# Patient Record
(demographics unavailable — no encounter records)

---

## 2024-10-09 NOTE — DISCUSSION/SUMMARY
[FreeTextEntry1] : Patient is cardiac stable.  There are no active cardiac issues.  Patient should remain on her present regimen of medication  Routine follow-up again in 4 months

## 2024-10-09 NOTE — REASON FOR VISIT
[FreeTextEntry1] : Naila Childress 65 years old here for routine follow-up of her cardiac status.  She was seen on October 9 2024.

## 2024-10-09 NOTE — ASSESSMENT
[FreeTextEntry1] : 1.  History of chronic ischemic heart disease status post PTCI of OM1 with patent stent on catheterization in 2020.   patient has had some recent recurrent symptoms of exertional jaw pain which has now resolved.  Presently she is asymptomatic from a cardiac point of view without chest pain chest pressure shortness of breath.  Patient remains asymptomatic continues to have a normal EKG  2.  Hypertension blood pressure today is on the lower side without symptoms.  She had a syncopal episode in May secondary to low blood pressure and hypokalemia her medications have been modified and presently she is asymptomatic with blood pressure of 115/70  3.  Hyperlipidemia on Repatha and Lipitor recently started because LDL repeat LDL was over 100 Lipitor was increased.  The number does not make a lot of sense light of the fact that she is also on Repatha and previous blood work had been at guideline..  Last LDL was in the mid 50s 4. Elevated BMI-patient has lost approximately 25 pounds on Mounjaro which may explain some of the decrease in blood pressure.  S lost over 50 pounds since being started on Mounjaro and is no longer on her other diabetic medication  5.  Connective tissue disorder with elevated sed rate AZALEA cardiolipin- recent sed rate  44 on hydrochloric when   presently there are no active cardiac issues,  pressure is toowell controlled and the patient has good exercise tolerance.  Lipid panel is not ideal despite multiple medications and these numbers should be repeated within 3 months

## 2024-10-09 NOTE — HISTORY OF PRESENT ILLNESS
[FreeTextEntry1] : Naila is 63 years old with a history of diabetes hypertension and hyperlipidemia presently on a statin and Repatha, status post stent to OM1 many years.  She was evaluated preoperatively for ventral hernia surgery and had some symptoms of shortness of breath and atypical chest pain.  Stress test was mildly abnormal and repeat angiogram revealed that the stent in the circumflex was widely patent and there was no other significant disease.  She underwent ventral hernia surgery in July with subsequent complications of an abscess which finally resolved  Overall she presently is feeling well without exertional chest pain chest pressure shortness of breath.  She denies dizziness or syncope.  EKG  previous visitst unremarkable and stable WNL  September 1, 2021 blood work revealed a hemoglobin of 13.2 hematocrit 40.8 WBC 8.10 with lymphocytes 44.9% T4 11.5 vitamin D 23.6 TSH 0.37 down from 6.64 LDL 73 triglycerides 136 cholesterol 145 Creatinine 1.52 calcium 10.1 potassium 4.2   The patient on last visit, from a cardiac point of view has been feeling well without chest pain chest pressure shortness of breath.  Her blood pressure has been slightly elevated and Microzide was added to her regimen  Several months ago, sh was coming downstairs she felt somewhat dizzy and unsteady on her feet and fell and hit the back of her head.  She went to [facility] IJ CT of his head was normal with repeat and all her blood work was in order an EKG was normal with no evidence of arrhythmia.  Blood tests were also normal.  She does have some element of ataxia and this is being evaluated by neurology  Patient has been doing well uses a CPAP machine for her obstructive sleep apnea is relatively careful with the salt in her diet and is compliant with her medications. She however remains overweight and perhaps is gained some weight. Her blood pressure has been on the higher side and hydrochlorothiazide was added at 12.5  Again she denies any significant chest pain chest pressure or shortness of breath. She is active.  She has had an extensive ophthalmologic and subsequent rheumatological evaluation for her dizziness abnormal eye exam, arthritis.  She has been diagnosed with a connective tissue disorder with an elevated sed rate 43 some abnormal cardiolipins positive AZALEA.  She is to be started on hydrochloroquine after final ophthalmologic evaluation  From a cardiac point of view she remained stable and remains on stable medication EKG 1/24/2022 normal sinus rhythm decreased R wave progression no significant change   recently, the patient has been complaining of exertional jaw pain very mild particularly when she is carrying heavy packages..  It lasts briefly.  We had evaluated her for similar symptoms several years ago with a repeat angiogram which was unremarkable with widely patent stents and no other significant disease.   EKG September 28, 2022 normal sinus rhythm and within normal limit    Recent blood tests September 2022  TSH 0.67 normal sed rate normal CRP potassium 5.2 GFR 98  lipid panel LDL 59 cholesterol 129 triglycerides 111   visit January 4 23: Patient is feeling quite well.  She still has stiffness in her hands particularly in the morning.  She is now on hydroxychloroquine and does see the ophthalmologist to check her retina while she is on the  hydroxychloroquine and everything appears to be fine .   From a cardiac point of view she has no symptoms no chest pain no shortness of breath.  She is tolerating her present regimen of medications including the Repatha.    EKG January 4, 2023 normal sinus rhythm within normal limit   visit May 23, 2023: Patient is being treated by rheumatology for an inflammatory arthritis on hydroxychloroquine.  Recent blood test were remarkable for an elevated sed rate but other markers were unremarkable   blood test in January 2023 HDL 53 LDL 33  patient is no longer taking Lipitor but is only on Repatha   she denies chest pain chest pressure shortness of breath palpitations dizziness or syncope  EKG MayMay 23, 2023 normal sinus rhythm within normal limits  Visit September 1, 2023:: Patient remains asymptomatic without chest pain chest pressure palpitations or shortness of breath.  She is now on Mounjaro and has lost a little bit of weight.  EKG September 1, 2023 is within normal limits  no michael  The patient was supposed to see me in October but left the blood work in July23 triglyceride 103 cholesterol 167 HDL 51 and LDL 95.  I placed her on Lipitor in addition to the Repatha.  She will get repeat blood test today  Visit April 9, 2024: The patient feels well without chest pain chest pressure shortness of breath.  She has been told that her blood pressure has been on the lower side running 110/60.  She denies dizziness.  Recently her LDL was above 100 despite being on Repatha.  This is quite different than her prior bloods which she had 1 point went down to the mid 50s LDL and last time was in the mid 70s.  Her Lipitor was increased to 40.  She remains on otherwise stable medication  EKG April 9, 2024 normal sinus rhythm within normal limits She has good exercise tolerance denies chest pain chest pressure lightheadedness or dizziness  2D echo in November 2023 was unremarkable with a hyperdynamic left ventricle no significant valvular disease She has lost approximately 20 to 30 pounds since last visit and is now on Mounjaro  Visit October 9, 2024 The patient in May had a syncopal episode.  She was hospitalized.  Her blood pressure was low and her potassium was low and her medications were modified.  She is lost about 50 pounds on Mounjaro and is now off of her diabetic meds except for the Mounjaro.  She has no chest pain chest pressure shortness of breath.  She occasionally feels somewhat lightheaded but has had no further syncope.

## 2024-10-09 NOTE — PHYSICAL EXAM
[Normal Appearance] : normal appearance [General Appearance - Well Nourished] : well nourished [General Appearance - In No Acute Distress] : no acute distress [Normal Conjunctiva] : the conjunctiva exhibited no abnormalities [No Jugular Venous Kim A Waves] : no jugular venous kim A waves [Heart Sounds] : normal S1 and S2 [Murmurs] : no murmurs present [Arterial Pulses Normal] : the arterial pulses were normal [Respiration, Rhythm And Depth] : normal respiratory rhythm and effort [Auscultation Breath Sounds / Voice Sounds] : lungs were clear to auscultation bilaterally [Abdomen Soft] : soft [Abdomen Tenderness] : non-tender [Abnormal Walk] : normal gait [Cyanosis, Localized] : no localized cyanosis [FreeTextEntry1] : No edema [Oriented To Time, Place, And Person] : oriented to person, place, and time [Affect] : the affect was normal [Mood] : the mood was normal

## 2024-11-05 NOTE — REASON FOR VISIT
I spoke with pt mom. Explained that the nurse tried calling her twice yesterday receiving no answer. The nurse also send a [employer] message. I apologized and offered them an afternoon appt. She denied it and asked that I send her a message.      Jane            ----- Message from Razia sent at 10/3/2024  9:26 AM CDT -----  Contact: [phone]  Elmer Castrokj mom Jolynn calling regarding stated she never rec'd a pre-check in for appt.  Advised her appt was canceled on yesterday.  Pt mom is upset because she had to rearrange her schedule to bring pt to appt.  She was never notified about the canceled appt.  She also mentioned this is Dr. Otto 2nd time canceling on pt.  she would like to be seen today.  Pt is on her way to the office.  
[Follow-Up: _____] : a [unfilled] follow-up visit

## 2024-11-05 NOTE — PHYSICAL EXAM
[General Appearance - Alert] : alert [General Appearance - In No Acute Distress] : in no acute distress [Respiration, Rhythm And Depth] : normal respiratory rhythm and effort [Musculoskeletal - Swelling] : no joint swelling seen [FreeTextEntry1] : hypopigmented spots arms and chest [Impaired Insight] : insight and judgment were intact

## 2024-11-05 NOTE — ASSESSMENT
[FreeTextEntry1] : #Chronic bilateral hand pain, associated with prolonged stiffness pain and stiffness improved since starting HCQ June 2022   #Positive AZALEA RNP 3.6, rest of serology negative Positive TPO antibodies   -continue with  mg daily current weight 149 lb, can continue same dose of HCQ for now -Obtain monitoring blood work today -last eye exam by Dr Alcala October 2024 reviewed today- cleared to continue  -check disease activity markers today -PFTs completed July 2022, following with pulm for SHAHID advised to reschedule PFTs for monitoring  -TTE/cardiac testing as per cardiology -to follow up with derm regarding hypopigmented skin lesions    RTO in 4 months

## 2024-11-05 NOTE — HISTORY OF PRESENT ILLNESS
[FreeTextEntry1] : Last visit July 2024 At today's visit reports feeling well overall occasional pain in the hands, no swelling, no stiffness no oral or nasal ulcers chronic hypopigmented skin lesions, possibly new spot chest, to see derm  otherwise feeling well on HCQ

## 2025-02-04 NOTE — ASSESSMENT
[FreeTextEntry1] : 1.  History of chronic ischemic heart disease status post PTCI of OM1 with patent stent on catheterization in 2020.   patient has had some recent recurrent symptoms of exertional jaw pain which has now resolved.  Presently she is asymptomatic from a cardiac point of view without chest pain chest pressure shortness of breath.  Patient remains asymptomatic continues to have a normal EKG.  She has no cardiac symptoms  2.  Hypertension blood pressure today is on the lower side without symptoms.  She had a syncopal episode in May secondary to low blood pressure and hypokalemia her medications have been modified and presently she is asymptomatic with blood pressure of 115/70 and remains asymptomatic in February 2025.  Blood pressure remains on the lower side and she did have a near syncopal episode several months ago.  I am not certain she continues to need to take Cozaar 25  3.  Hyperlipidemia on Repatha and Lipitor recently started because LDL repeat LDL was over 100 Lipitor was increased.  The number does not make a lot of sense light of the fact that she is also on Repatha and previous blood work had been at guideline..  Last LDL was in the mid 50s 4. Elevated BMI-patient has lost approximately 25 pounds on Mounjaro which may explain some of the decrease in blood pressure.  S lost over 50 pounds since being started on Mounjaro and is no longer on her other diabetic medication  5.  Connective tissue disorder with elevated sed rate AZALEA cardiolipin- recent sed rate  44 on Hydrochlloroquin   presently there are no active cardiac issues,  pressure is to0 well controlled There are no active cardiac issues in February 2025

## 2025-02-04 NOTE — REASON FOR VISIT
[FreeTextEntry1] : Naila Childress 65 years old here for routine follow-up of her cardiac status.  She was seen on February 4, 2025

## 2025-02-04 NOTE — DISCUSSION/SUMMARY
[FreeTextEntry1] : 1.  I suggested that she stop losartan 25 in light of her lower blood pressure and previous near syncopal episode 2.  She should continue her other medication 3.  Routine follow-up again in 6 months unless is a change in his status  I reassured her that her cardiac status was stable [EKG obtained to assist in diagnosis and management of assessed problem(s)] : EKG obtained to assist in diagnosis and management of assessed problem(s)

## 2025-02-04 NOTE — HISTORY OF PRESENT ILLNESS
[FreeTextEntry1] : Naila is 65 years old with a history of diabetes hypertension and hyperlipidemia presently on a statin and Repatha, status post stent to OM1 many years.  She was evaluated preoperatively for ventral hernia surgery and had some symptoms of shortness of breath and atypical chest pain.  Stress test was mildly abnormal and repeat angiogram revealed that the stent in the circumflex was widely patent and there was no other significant disease.  She underwent ventral hernia surgery in July with subsequent complications of an abscess which finally resolved  Overall she presently is feeling well without exertional chest pain chest pressure shortness of breath.  She denies dizziness or syncope.  EKG  previous visitst unremarkable and stable WNL  September 1, 2021 blood work revealed a hemoglobin of 13.2 hematocrit 40.8 WBC 8.10 with lymphocytes 44.9% T4 11.5 vitamin D 23.6 TSH 0.37 down from 6.64 LDL 73 triglycerides 136 cholesterol 145 Creatinine 1.52 calcium 10.1 potassium 4.2   The patient on last visit, from a cardiac point of view has been feeling well without chest pain chest pressure shortness of breath.  Her blood pressure has been slightly elevated and Microzide was added to her regimen  Several months ago, sh was coming downstairs she felt somewhat dizzy and unsteady on her feet and fell and hit the back of her head.  She went to [facility] IJ CT of his head was normal with repeat and all her blood work was in order an EKG was normal with no evidence of arrhythmia.  Blood tests were also normal.  She does have some element of ataxia and this is being evaluated by neurology  Patient has been doing well uses a CPAP machine for her obstructive sleep apnea is relatively careful with the salt in her diet and is compliant with her medications. She however remains overweight and perhaps is gained some weight. Her blood pressure has been on the higher side and hydrochlorothiazide was added at 12.5  Again she denies any significant chest pain chest pressure or shortness of breath. She is active.  She has had an extensive ophthalmologic and subsequent rheumatological evaluation for her dizziness abnormal eye exam, arthritis.  She has been diagnosed with a connective tissue disorder with an elevated sed rate 43 some abnormal cardiolipins positive AZALEA.  She is to be started on hydrochloroquine after final ophthalmologic evaluation  From a cardiac point of view she remained stable and remains on stable medication EKG 1/24/2022 normal sinus rhythm decreased R wave progression no significant change   recently, the patient has been complaining of exertional jaw pain very mild particularly when she is carrying heavy packages..  It lasts briefly.  We had evaluated her for similar symptoms several years ago with a repeat angiogram which was unremarkable with widely patent stents and no other significant disease.   EKG September 28, 2022 normal sinus rhythm and within normal limit    Recent blood tests September 2022  TSH 0.67 normal sed rate normal CRP potassium 5.2 GFR 98  lipid panel LDL 59 cholesterol 129 triglycerides 111   visit January 4 23: Patient is feeling quite well.  She still has stiffness in her hands particularly in the morning.  She is now on hydroxychloroquine and does see the ophthalmologist to check her retina while she is on the  hydroxychloroquine and everything appears to be fine .   From a cardiac point of view she has no symptoms no chest pain no shortness of breath.  She is tolerating her present regimen of medications including the Repatha.    EKG January 4, 2023 normal sinus rhythm within normal limit   visit May 23, 2023: Patient is being treated by rheumatology for an inflammatory arthritis on hydroxychloroquine.  Recent blood test were remarkable for an elevated sed rate but other markers were unremarkable   blood test in January 2023 HDL 53 LDL 33  patient is no longer taking Lipitor but is only on Repatha   she denies chest pain chest pressure shortness of breath palpitations dizziness or syncope  EKG MayMay 23, 2023 normal sinus rhythm within normal limits  Visit September 1, 2023:: Patient remains asymptomatic without chest pain chest pressure palpitations or shortness of breath.  She is now on Mounjaro and has lost a little bit of weight.  EKG September 1, 2023 is within normal limits  no michael  The patient was supposed to see me in October but left the blood work in July23 triglyceride 103 cholesterol 167 HDL 51 and LDL 95.  I placed her on Lipitor in addition to the Repatha.  She will get repeat blood test today  Visit April 9, 2024: The patient feels well without chest pain chest pressure shortness of breath.  She has been told that her blood pressure has been on the lower side running 110/60.  She denies dizziness.  Recently her LDL was above 100 despite being on Repatha.  This is quite different than her prior bloods which she had 1 point went down to the mid 50s LDL and last time was in the mid 70s.  Her Lipitor was increased to 40.  She remains on otherwise stable medication  EKG April 9, 2024 normal sinus rhythm within normal limits She has good exercise tolerance denies chest pain chest pressure lightheadedness or dizziness  2D echo in November 2023 was unremarkable with a hyperdynamic left ventricle no significant valvular disease She has lost approximately 20 to 30 pounds since last visit and is now on Mounjaro  Visit October 9, 2024 The patient in May had a syncopal episode.  She was hospitalized.  Her blood pressure was low and her potassium was low and her medications were modified.  She is lost about 50 pounds on Mounjaro and is now off of her diabetic meds except for the Mounjaro.  She has no chest pain chest pressure shortness of breath.  She occasionally feels somewhat lightheaded but has had no further syncope.  Visit February 4, 2025 Patient overall feels well.  She recently has had some increased fatigue but no dizziness and no syncope.  She has no chest pain chest pressure or shortness of breath..  She is still on Mounjaro and no longer has elements of diabetes.  EKG February 4, 2025 normal sinus rhythm within normal limits Medications presently include aspirin 81 atorvastatin 40 Effexor 75Hydroxychloroquine 400 twice a dayLosartan 25Metoprolol ER 25Mounjaro and Repatha.  She is also on Tirosint 137 mcg  Lipid panel July 2024 HDL 46 LDL 51

## 2025-03-13 NOTE — PHYSICAL EXAM
[General Appearance - Alert] : alert [General Appearance - In No Acute Distress] : in no acute distress [Impaired Insight] : insight and judgment were intact [Musculoskeletal - Swelling] : no joint swelling seen [FreeTextEntry1] : taut skin over the fingers distally, small oral aperture

## 2025-03-13 NOTE — ASSESSMENT
[FreeTextEntry1] : #Chronic bilateral hand pain, associated with prolonged stiffness pain and stiffness improved since starting HCQ June 2022 recurrence now with morning stiffness for 45 min, episode of swelling (resolved)  # UCTD Positive AZALEA; RNP 3.6, rest of serology negative small oral aperture, faint telangiectasias (face, hands), taut skin distally; no Raynaud's sister with scleroderma  scleroderma labs negative    -will obtain ESR/CRP and RF/CCP today -will obtain X-rays of the hands and US today  -continue with  mg daily current weight 149 lb, can continue same dose of HCQ for now -discussed with patient possibly moving to DMARDs if recurrence of hand swelling and/or if above work up is abnormal  -Obtain monitoring blood work today -last eye exam by Dr Alcala October 2024 reviewed today- cleared to continue -check disease activity markers today -PFTs completed July 2022, following with pulm for SHAHID advised to reschedule PFTs for monitoring Gets annual CT chest for lung cancer screening  -TTE/cardiac testing as per cardiology, last seen Feb 2025 note reviewed today, stable  -following up with derm regarding hypopigmented skin lesions   RTO in 4 months.

## 2025-03-13 NOTE — DATA REVIEWED
[FreeTextEntry1] : Labs and chart notes reviewed today with patient disease activity markers negative in November 2024

## 2025-03-13 NOTE — HISTORY OF PRESENT ILLNESS
[FreeTextEntry1] :     Last visit November 2024 At today's visit -feeling well overall, -was recently in [place] and had an episode of swelling of the hands/fingers with stiffness resolved but continues to have morning stiffness for 45 minutes

## 2025-04-02 NOTE — ASSESSMENT
[FreeTextEntry1] : This is a 65-year-old female whose history has been reviewed above  She has a history of a autoimmune disease she has recently seen rheumatology serologic workup was performed I reviewed completely negative including sed rate and double-stranded DNA  She has a long history of hypertension but has had significant weight loss she has recently been taken off her losartan which was only 25 mg.  She has been getting elevated blood pressures at home but states that when she is not having a headache that normal blood pressure here is entirely normal in fact it is on the low side of normal but she does not have any orthostasis.  She is not on any antihypertensives she is asymptomatic no intervention  She has a history of a mood disorder she remains on Wellbutrin with excellent results  She remains on Mounjaro she has lost 60 pounds she will continue with this medication.  We will treat her empirically for sinus pain or maxillary facial pain syndrome.  However we will start with Flonase and Zyrtec..  I told her if the headaches persist to give me a call.  And to keep taking her blood pressure  I will defer her hemoglobin A1c and cholesterol profile to endocrinology

## 2025-04-02 NOTE — HISTORY OF PRESENT ILLNESS
[FreeTextEntry1] : This is a 65-year-old female for evaluation and treatment of her diabetes ASHD [de-identified] : Patient is in her usual state of health with the exception of having a headache on and off for 4 days.  She relates that when she gets the headache her blood pressure has been significantly elevated at 170 systolic.  She does not have the headache at this time

## 2025-05-02 NOTE — HISTORY OF PRESENT ILLNESS
[TextBox_4] : Interventional Pulmonology Consultation Note First Visit with IP: May  5 2025  2:30PM    Ms. RAMIREZ is a 65-year-old with PMH of connective tissue, hypertension, diabetes, obesity, and sleep apnea. Patient was referred to interventional pulmonology by Dr. Castaneda for abnormal CT of the Chest and possible bronchoscopy with tissue biopsy for a 1 cm circumscribed nodule in the right upper lobe.  Per records, the patient has been following up with Dr. Castaneda since 2017, who has been following her nodule. At that time, she had an 8 mm well-circumscribed right upper lobe nodule. She has been compliant with her surveillance imaging. As of 2022, the nodule measurements have changed from 8 mm to 11 mm and were deemed stable.    Patient most recent CT scan of the Chest done on 04/09/2025 at [facility] was notable for the following: - Since 9/23/2024 unchanged right upper lobe circumscribed 1.1 cm right upper lobe solid nodule. - Unchanged right upper lobe 0.6 cm groundglass nodule.  Ms. RAMIREZ  past medical history is notable for: Anticoagulation: [ ] Emphysema: [ ] Personal History of Lung Cancer: [ ] Family History of Lung Cancer: [ ] Previous imaging or biopsy:  [ ] History of Fungal or Mycobacterial Infections:  [ ] History of Autoimmune Conditions: [ ] Smoking history of []   Today She is [ ]

## 2025-05-02 NOTE — DISCUSSION/SUMMARY
[FreeTextEntry1] : The patients most recent CT scan was reviewed by my independently and my interpretation is stated below: -The nodule has increased in size compared to 2017. Per my measurement, it is 1.3 cm.

## 2025-05-02 NOTE — HISTORY OF PRESENT ILLNESS
[TextBox_4] : Interventional Pulmonology Consultation Note First Visit with IP: May  5 2025  2:30PM    Ms. RAMIREZ is a 65-year-old with PMH of connective tissue, hypertension, diabetes, obesity, and sleep apnea. Patient was referred to interventional pulmonology by Dr. Castaneda for abnormal CT of the Chest and possible bronchoscopy with tissue biopsy for a 1 cm circumscribed nodule in the right upper lobe.  Per records, the patient has been following up with Dr. Castaneda since 2017, who has been following her nodule. At that time, she had an 8 mm well-circumscribed right upper lobe nodule. She has been compliant with her surveillance imaging. As of 2022, the nodule measurements have changed from 8 mm to 11 mm and were deemed stable.    Patient most recent CT scan of the Chest done on 04/09/2025 at [facility] was notable for the following: - Since 9/23/2024 unchanged right upper lobe circumscribed 1.1 cm right upper lobe solid nodule. - Unchanged right upper lobe 0.6 cm groundglass nodule.  Ms. RAMIREZ  past medical history is notable for: Anticoagulation: [ ] Emphysema: [ ] Personal History of Lung Cancer: [ ] Family History of Lung Cancer: [ ] Previous imaging or biopsy:  [ ] History of Fungal or Mycobacterial Infections:  [ ] History of Autoimmune Conditions: [ ] Smoking history of []   Today She is [ ]

## 2025-05-02 NOTE — ASSESSMENT
[FreeTextEntry1] :  Ms. RAMIREZ is a 65-year-old with PMH of connective tissue, hypertension, diabetes, obesity, and sleep apnea. Patient was referred to interventional pulmonology due to having an unchanged right upper lobe circumscribed 1.1 cm right upper lobe solid nodule and unchanged right upper lobe 0.6 cm groundglass nodule.  The differential diagnosis of the nodule based on location, history and appearance including malignancy, infectious etiology, inflammatory etiology and other etiologies was discussed   The diagnostic yield of robotic assisted navigation assisted bronchoscopy with biopsy as well as EBUS with biopsy was discussed with the patient. The risk and benefits of bronchoscopy with navigation assisted transbronchial biopsy including risk of bleeding and risk pneumothorax was discussed with the patient and they demonstrated understanding. In case of pneumothorax, we discussed the need for in hospital monitoring and chest tube placement. In case of bleeding, we explained that they may require inpatient or ICU admission. In case the lesion is suspicious for malignancy on preliminary or there is mediastinal or hilar adenopathy, the patient will need staging of the mediastinum with EBUS guided TBNA in the same procedure. The risk and benefits of EBUS including the risk of bleeding and risk of pneumothorax associated with EBUS was discussed with the patient and he demonstrated understanding. We will also send the tissue/BAL for culture to assess for infectious etiology during the procedure. The patient is agreeable to proceed with a navigation assisted bronchoscopy with biopsy of the lung lesion and EBUS with TBNA. The patient will undergo PST to assess candidacy of general anesthesia as well as discuss the risks and benefits with the anesthesiologist. Educational reading material regarding the procedure, risks and benefits provided to the patient in paper format.   Will need presurgical testing prior to scheduling the procedure. The office will co-ordinate testing and pre-surgical appointment. Will need medical clearance. If cardiac co-morbidities noted, may need additional cardiac clearance. Planned for flexible bronchoscopy, robotic assisted navigation bronchoscopy with biopsy of the lung lesion/rEBUS and evaluation of the hilum and mediastinum using linear EBUS on _/_/_.

## 2025-05-23 NOTE — PHYSICAL EXAM
[Normal Appearance] : normal appearance [General Appearance - Well Nourished] : well nourished [General Appearance - In No Acute Distress] : no acute distress [Normal Conjunctiva] : the conjunctiva exhibited no abnormalities [No Jugular Venous Kim A Waves] : no jugular venous kim A waves [Heart Sounds] : normal S1 and S2 [Murmurs] : no murmurs present [Arterial Pulses Normal] : the arterial pulses were normal [Respiration, Rhythm And Depth] : normal respiratory rhythm and effort [Auscultation Breath Sounds / Voice Sounds] : lungs were clear to auscultation bilaterally [Abdomen Soft] : soft [Abdomen Tenderness] : non-tender [Abnormal Walk] : normal gait [Cyanosis, Localized] : no localized cyanosis [Oriented To Time, Place, And Person] : oriented to person, place, and time [Affect] : the affect was normal [Mood] : the mood was normal [FreeTextEntry1] : No edema

## 2025-05-23 NOTE — ASSESSMENT
[FreeTextEntry1] : 1.  History of chronic ischemic heart disease status post PTCI of OM1 with patent stent on catheterization in 2020.   patient has had some recent recurrent symptoms of exertional jaw pain which has now resolved.  Presently she is asymptomatic from a cardiac point of view without chest pain chest pressure shortness of breath.  Patient remains asymptomatic continues to have a normal EKG.  She has no cardiac symptoms  2.  Hypertension blood pressure today is on the lower side without symptoms.  She had a syncopal episode in May secondary to low blood pressure and hypokalemia her medications have been modified and presently she is asymptomatic with blood pressure of 115/70 and remains asymptomatic in February 2025.  Blood pressure remains on the lower side and she did have a near syncopal episode several months ago.  I am not certain she continues to need to take Cozaar 25  3.  Hyperlipidemia on Repatha Most recent LDL 37 4. Elevated BMI-patient has lost approximately 25 pounds on Mounjaro which may explain some of the decrease in blood pressure.  S lost over 50 pounds since being started on Mounjaro and is no longer on her other diabetic medication  5.  Connective tissue disorder with elevated sed rate AZALEA cardiolipin- recent sed rate  44 on Hydrochlloroquin 6.  Preop for bronchoscopy biopsy  presently there are no active cardiac issues,  pressure is to0 well controlled There are no active cardiac issues in   May 2025.

## 2025-05-23 NOTE — DISCUSSION/SUMMARY
[FreeTextEntry1] : Patient's cardiac status is quite stable.  There is no cardiac contraindication to the planned bronchoscopy/biopsy.  No further cardiac workup is needed she is hemodynamically stable

## 2025-05-23 NOTE — REASON FOR VISIT
[FreeTextEntry1] : Naila Childress 65 years old here for preop cardiovascular evaluation prior to undergoing bronchoscopic biopsyFor solitary lung nodule. She was seen on May 23, 2025

## 2025-05-23 NOTE — ASSESSMENT
[FreeTextEntry4] : This is a 64-year-old female for evaluation prior to lung biopsy.  She does have a pulmonary nodule that has been followed but has had some changes.  Her past medical history is that of ASHD obesity sleep apnea and inflammatory arthritis Hashimoto's thyroiditis diabetes and smoking.  In terms of her ASHD she did have a single stent placed in the distant past..  (She had a negative stress test in 2022 and a essentially normal echo in 2023) in 2020 she had an angiogram for atypical chest pain which showed no obstructive lesions.  In addition she does have carotid artery stenosis which is bilateral in 50 to 60%.  She does have sleep apnea if she remains on CPAP. Patient has lost 60 pounds on Mounjaro now in addition she remains on Wellbutrin with excellent results.  Her review of systems is negative specifically no chest pain shortness of breath or evidence of infection.  Her various maladies are under control.  Pending laboratory I see no medical contraindications to procedure

## 2025-05-23 NOTE — HISTORY OF PRESENT ILLNESS
[FreeTextEntry1] : Naila is 65 years old with a history of diabetes hypertension and hyperlipidemia presently on a statin and Repatha, status post stent to OM1 many years.  She was evaluated preoperatively for ventral hernia surgery and had some symptoms of shortness of breath and atypical chest pain.  Stress test was mildly abnormal and repeat angiogram revealed that the stent in the circumflex was widely patent and there was no other significant disease.  She underwent ventral hernia surgery in July with subsequent complications of an abscess which finally resolved  Overall she presently is feeling well without exertional chest pain chest pressure shortness of breath.  She denies dizziness or syncope.  EKG  previous visitst unremarkable and stable WNL  September 1, 2021 blood work revealed a hemoglobin of 13.2 hematocrit 40.8 WBC 8.10 with lymphocytes 44.9% T4 11.5 vitamin D 23.6 TSH 0.37 down from 6.64 LDL 73 triglycerides 136 cholesterol 145 Creatinine 1.52 calcium 10.1 potassium 4.2   The patient on last visit, from a cardiac point of view has been feeling well without chest pain chest pressure shortness of breath.  Her blood pressure has been slightly elevated and Microzide was added to her regimen  Several months ago, sh was coming downstairs she felt somewhat dizzy and unsteady on her feet and fell and hit the back of her head.  She went to [facility] IJ CT of his head was normal with repeat and all her blood work was in order an EKG was normal with no evidence of arrhythmia.  Blood tests were also normal.  She does have some element of ataxia and this is being evaluated by neurology  Patient has been doing well uses a CPAP machine for her obstructive sleep apnea is relatively careful with the salt in her diet and is compliant with her medications. She however remains overweight and perhaps is gained some weight. Her blood pressure has been on the higher side and hydrochlorothiazide was added at 12.5  Again she denies any significant chest pain chest pressure or shortness of breath. She is active.  She has had an extensive ophthalmologic and subsequent rheumatological evaluation for her dizziness abnormal eye exam, arthritis.  She has been diagnosed with a connective tissue disorder with an elevated sed rate 43 some abnormal cardiolipins positive AZALEA.  She is to be started on hydrochloroquine after final ophthalmologic evaluation  From a cardiac point of view she remained stable and remains on stable medication EKG 1/24/2022 normal sinus rhythm decreased R wave progression no significant change   recently, the patient has been complaining of exertional jaw pain very mild particularly when she is carrying heavy packages..  It lasts briefly.  We had evaluated her for similar symptoms several years ago with a repeat angiogram which was unremarkable with widely patent stents and no other significant disease.   EKG September 28, 2022 normal sinus rhythm and within normal limit    Recent blood tests September 2022  TSH 0.67 normal sed rate normal CRP potassium 5.2 GFR 98  lipid panel LDL 59 cholesterol 129 triglycerides 111   visit January 4 23: Patient is feeling quite well.  She still has stiffness in her hands particularly in the morning.  She is now on hydroxychloroquine and does see the ophthalmologist to check her retina while she is on the  hydroxychloroquine and everything appears to be fine .   From a cardiac point of view she has no symptoms no chest pain no shortness of breath.  She is tolerating her present regimen of medications including the Repatha.    EKG January 4, 2023 normal sinus rhythm within normal limit   visit May 23, 2023: Patient is being treated by rheumatology for an inflammatory arthritis on hydroxychloroquine.  Recent blood test were remarkable for an elevated sed rate but other markers were unremarkable   blood test in January 2023 HDL 53 LDL 33  patient is no longer taking Lipitor but is only on Repatha   she denies chest pain chest pressure shortness of breath palpitations dizziness or syncope  EKG MayMay 23, 2023 normal sinus rhythm within normal limits  Visit September 1, 2023:: Patient remains asymptomatic without chest pain chest pressure palpitations or shortness of breath.  She is now on Mounjaro and has lost a little bit of weight.  EKG September 1, 2023 is within normal limits  no michael  The patient was supposed to see me in October but left the blood work in July23 triglyceride 103 cholesterol 167 HDL 51 and LDL 95.  I placed her on Lipitor in addition to the Repatha.  She will get repeat blood test today  Visit April 9, 2024: The patient feels well without chest pain chest pressure shortness of breath.  She has been told that her blood pressure has been on the lower side running 110/60.  She denies dizziness.  Recently her LDL was above 100 despite being on Repatha.  This is quite different than her prior bloods which she had 1 point went down to the mid 50s LDL and last time was in the mid 70s.  Her Lipitor was increased to 40.  She remains on otherwise stable medication  EKG April 9, 2024 normal sinus rhythm within normal limits She has good exercise tolerance denies chest pain chest pressure lightheadedness or dizziness  2D echo in November 2023 was unremarkable with a hyperdynamic left ventricle no significant valvular disease She has lost approximately 20 to 30 pounds since last visit and is now on Mounjaro  Visit October 9, 2024 The patient in May had a syncopal episode.  She was hospitalized.  Her blood pressure was low and her potassium was low and her medications were modified.  She is lost about 50 pounds on Mounjaro and is now off of her diabetic meds except for the Mounjaro.  She has no chest pain chest pressure shortness of breath.  She occasionally feels somewhat lightheaded but has had no further syncope.  Visit February 4, 2025 Patient overall feels well.  She recently has had some increased fatigue but no dizziness and no syncope.  She has no chest pain chest pressure or shortness of breath..  She is still on Mounjaro and no longer has elements of diabetes.  EKG February 4, 2025 normal sinus rhythm within normal limits Medications presently include aspirin 81 atorvastatin 40 Effexor 75Hydroxychloroquine 400 twice a dayLosartan 25Metoprolol ER 25Mounjaro and Repatha.  She is also on Tirosint 137 mcg  Lipid panel July 2024 HDL 46 LDL 51  VisitMay 23, 2025 The patient is preop for lung biopsy via bronchoscopy From a cardiac point of view she has been quite stable without chest pain chest pressure shortness of breath.She hasObstructive sleep apnea and is compliant with her CPAP She has excellent exercise tolerance no chest pain chest pressure shortness of breath.  She is compliant with her medications.

## 2025-05-23 NOTE — HISTORY OF PRESENT ILLNESS
[Coronary Artery Disease] : coronary artery disease [Diabetes] : diabetes [Aortic Stenosis] : no aortic stenosis [Atrial Fibrillation] : no atrial fibrillation [Recent Myocardial Infarction] : no recent myocardial infarction [Implantable Device/Pacemaker] : no implantable device/pacemaker [No Pertinent Pulmonary History] : no history of asthma, COPD, sleep apnea, or smoking [Self] : no previous adverse anesthesia reaction [Chronic Anticoagulation] : no chronic anticoagulation [Chronic Kidney Disease] : no chronic kidney disease [FreeTextEntry1] : Lung biopsy [FreeTextEntry2] : 6/10/2025 [FreeTextEntry3] :  [FreeTextEntry4] : This is a 64-year-old female for evaluation prior to lung biopsy.  She does have a pulmonary nodule that has been followed but has had some changes.  Her past medical history is that of ASHD obesity sleep apnea and inflammatory arthritis Hashimoto's thyroiditis diabetes and smoking.  In terms of her ASHD she did have a single stent placed in the distant past..  (She had a negative stress test in 2022 and a essentially normal echo in 2023) in 2020 she had an angiogram for atypical chest pain which showed no obstructive lesions.  In addition she does have carotid artery stenosis which is bilateral in 50 to 60%.  She does have sleep apnea if she remains on CPAP.

## 2025-05-23 NOTE — ADDENDUM
[FreeTextEntry1] : I spent 30 minutes face-to-face reviewing past history laboratory and CAT scan as well as pulmonary's note

## 2025-06-27 NOTE — REASON FOR VISIT
[Consultation] : a consultation [Pulmonary Nodules] : pulmonary nodules [Follow-Up] : a follow-up visit [TextBox_13] :

## 2025-06-27 NOTE — ASSESSMENT
[FreeTextEntry1] :  Ms. RAMIREZ is a 65-year-old with PMH of connective tissue, hypertension, diabetes, obesity, and sleep apnea. Patient was referred to interventional pulmonology due to having an unchanged right upper lobe circumscribed 1.1 cm right upper lobe solid nodule and unchanged right upper lobe 0.6 cm groundglass nodule.   Ms. RAMIREZ underwent a flexible bronchoscopy/navigational bronchoscopy/EBUS (LN 4R)/BAL on 06/10/2025. Patient tolerated procedure well. No issues post-procedure. No hemoptysis. Some cough and sore throat, no dyspnea or chest pain.   The results were negative for malignant cells (RUL forcep/FNA biopsy) consistent with pulmonary hamartoma.   - (LN 4R) negative for malignant cells-reactive lymph node shows reactive bronchial epithelial cells in the background of polymorphous population of lymphocytes.   - BAL was negative for malignant cells showed moderately cellular specimen composed of groups of reactive bronchial epithelial cells and alveolar macrophages.   - Culture results were negative to date.    These results were discussed with the patient.  The neck step is to follow-up with Dr. Castaneda.  Surveillance imaging was recommended.  Will follow-up cultures for full 6 weeks to ensure no positive growth.  Will need follow-up for the 6 mm groundglass nodule.  At this time no further interventions from interventional pulmonary standpoint.  Copy of the results were provided to the patient.

## 2025-06-27 NOTE — PHYSICAL EXAM
[No Acute Distress] : no acute distress [Normal Oropharynx] : normal oropharynx [Normal Appearance] : normal appearance [Normal Rate/Rhythm] : normal rate/rhythm [Normal S1, S2] : normal s1, s2 [No Resp Distress] : no resp distress [No Acc Muscle Use] : no acc muscle use [Clear to Auscultation Bilaterally] : clear to auscultation bilaterally [No Abnormalities] : no abnormalities [Benign] : benign [Normal Gait] : normal gait [No Edema] : no edema [Normal Color/ Pigmentation] : normal color/ pigmentation [No Focal Deficits] : no focal deficits [Oriented x3] : oriented x3 [Normal Affect] : normal affect

## 2025-06-27 NOTE — HISTORY OF PRESENT ILLNESS
[Never] : never [TextBox_4] : Interventional Pulmonology Consultation Note Follow Up Visit with IP:  Jun 27 2025  1:30PM    Ms. RAMIREZ is a 65-year-old with PMH of connective tissue, hypertension, diabetes, obesity, and sleep apnea. Patient was referred to interventional pulmonology due to having an unchanged right upper lobe circumscribed 1.1 cm right upper lobe solid nodule and unchanged right upper lobe 0.6 cm groundglass nodule.   Ms. RAMIREZ is following up with Interventional Pulmonology today status post flexible bronchoscopy/navigational bronchoscopy/EBUS (LN 4R)/BAL on 06/10/2025.  Results were negative for malignant cells (RUL forceps/FNA biopsy), consistent with pulmonary hamartoma.  (LN 4R) negative for malignant cells-reactive lymph node shows reactive bronchial epithelial cells in the background of polymorphous population of lymphocytes.  BAL was negative for malignant cells and showed a moderate cellular specimen composed of groups of reactive bronchial epithelial cells and alveolar macrophages.  Culture results were negative to date.  Today she presents for follow-up.  Continues to do well.  No respiratory symptoms noted.

## 2025-07-11 NOTE — PHYSICAL EXAM
[No Acute Distress] : no acute distress [Well Nourished] : well nourished [Well Developed] : well developed [Well-Appearing] : well-appearing [Normal Sclera/Conjunctiva] : normal sclera/conjunctiva [PERRL] : pupils equal round and reactive to light [EOMI] : extraocular movements intact [Normal Outer Ear/Nose] : the outer ears and nose were normal in appearance [Normal Oropharynx] : the oropharynx was normal [No JVD] : no jugular venous distention [No Lymphadenopathy] : no lymphadenopathy [Supple] : supple [Thyroid Normal, No Nodules] : the thyroid was normal and there were no nodules present [No Respiratory Distress] : no respiratory distress  [No Accessory Muscle Use] : no accessory muscle use [Clear to Auscultation] : lungs were clear to auscultation bilaterally [Normal Rate] : normal rate  [Regular Rhythm] : with a regular rhythm [Normal S1, S2] : normal S1 and S2 [No Murmur] : no murmur heard [No Carotid Bruits] : no carotid bruits [No Abdominal Bruit] : a ~M bruit was not heard ~T in the abdomen [No Varicosities] : no varicosities [Pedal Pulses Present] : the pedal pulses are present [No Edema] : there was no peripheral edema [No Palpable Aorta] : no palpable aorta [No Extremity Clubbing/Cyanosis] : no extremity clubbing/cyanosis [Soft] : abdomen soft [Non Tender] : non-tender [Non-distended] : non-distended [No Masses] : no abdominal mass palpated [No HSM] : no HSM [Normal Bowel Sounds] : normal bowel sounds [Normal Posterior Cervical Nodes] : no posterior cervical lymphadenopathy [Normal Anterior Cervical Nodes] : no anterior cervical lymphadenopathy [No CVA Tenderness] : no CVA  tenderness [No Spinal Tenderness] : no spinal tenderness [No Joint Swelling] : no joint swelling [Grossly Normal Strength/Tone] : grossly normal strength/tone [No Rash] : no rash [Coordination Grossly Intact] : coordination grossly intact [No Focal Deficits] : no focal deficits [Normal Gait] : normal gait [Deep Tendon Reflexes (DTR)] : deep tendon reflexes were 2+ and symmetric [Normal Affect] : the affect was normal [Normal Insight/Judgement] : insight and judgment were intact [de-identified] : Overweight [de-identified] : Right lower quadrant pain

## 2025-07-11 NOTE — HEALTH RISK ASSESSMENT
[Good] : ~his/her~  mood as  good [Yes] : Yes [No] : In the past 12 months have you used drugs other than those required for medical reasons? No [No falls in past year] : Patient reported no falls in the past year [0] : 2) Feeling down, depressed, or hopeless: Not at all (0) [PHQ-2 Negative - No further assessment needed] : PHQ-2 Negative - No further assessment needed [Current] : Current [Learning/Retaining New Information] : difficulty learning/retaining new information [Spatial Ability and Orientation] : difficulty with spatial ability and orientation [With Significant Other] : lives with significant other [Retired] : retired [College] : College [] :  [Sexually Active] : sexually active [Feels Safe at Home] : Feels safe at home [Fully functional (bathing, dressing, toileting, transferring, walking, feeding)] : Fully functional (bathing, dressing, toileting, transferring, walking, feeding) [Fully functional (using the telephone, shopping, preparing meals, housekeeping, doing laundry, using] : Fully functional and needs no help or supervision to perform IADLs (using the telephone, shopping, preparing meals, housekeeping, doing laundry, using transportation, managing medications and managing finances) [Smoke Detector] : smoke detector [Carbon Monoxide Detector] : carbon monoxide detector [Seat Belt] :  uses seat belt [Sunscreen] : uses sunscreen [Travel to Developing Areas] : travel to developing areas [I will adhere to the patient's wishes.] : I will adhere to the patient's wishes. [[id number]] : 0 [Change in mental status noted] : No change in mental status noted [Language] : denies difficulty with language [Behavior] : denies difficulty with behavior [Handling Complex Tasks] : denies difficulty handling complex tasks [Reasoning] : denies difficulty with reasoning [Reports changes in hearing] : Reports no changes in hearing [Reports changes in vision] : Reports no changes in vision [Reports changes in dental health] : Reports no changes in dental health [Safety elements used in home] : no safety elements used in home [TB Exposure] : is not being exposed to tuberculosis [Caregiver Concerns] : does not have caregiver concerns [de-identified] : Cogent

## 2025-07-11 NOTE — HISTORY OF PRESENT ILLNESS
[FreeTextEntry1] : This is a 66-year-old female for annual health assessment.  Specifically we will address her history of ASHD obesity syncope sleep apnea inflammatory arthritis Hashimoto's diabetes and smoking [de-identified] : Patient has been following up appropriately.  She did have a negative lung biopsy.  She also had a flare of her inflammatory arthritis  She is complaining of right lower quadrant pain and flank pain.

## 2025-07-11 NOTE — ASSESSMENT
[Vaccines Reviewed] : Immunizations reviewed today. Please see immunization details in the vaccine log within the immunization flowsheet.  [FreeTextEntry1] : This is a 66-year-old female whose history has been reviewed above  She has a history of both diabetes and obesity she is on Mounjaro.  This been stopped for a period because of a lung biopsy she is restarted she will continue her weight loss.  In May of last year she had an episode of syncope and was hospitalized for 2 days most likely cause was hypoglycemia hypotension and dehydration.  She has had no further episodes she was worked up thoroughly and continues to follow with neurology for this and for vertigo  Patient has a history of coronary artery disease she had a stent single stent placed in the distant past.  In 2020 an angiogram was done for atypical chest pain with no obstructive lesions  She continues to get intermittent episodes of jaw pain but not at this time  She did have an echocardiogram in 2023 which was essentially normal and a stress test which was negative for ischemia in 2022.  She remains on Repatha and atorvastatin for cholesterol control and LDL was obtained we will aim for an level of between 50 and 70  She has a history of depression she remains on Effexor with good results  She does have mild carotid artery stenosis followed by neurology 50 to 60% bilaterally  She does have an inflammatory joint disease she had a mild flare she has a positive AZALEA and a double-stranded DNA positive RNP and positive TPO antibodies she remains on hydroxychloroquine and follows with ophthalmology as well as rheumatology  She does have a history of sleep apnea and remains on CPAP with good results  In terms of her diabetes a microalbumin and hemoglobin A1c were obtained  She continues to smoke and does follow-up for routine CTs she did have a nodule which was biopsied and was negative this year  She has a history of hypothyroidism she remains on Synthroid a TSH was obtained recommendations pending results  She does have a past medical history of a pelvic abscess she has some right lower quadrant pain I will start with an ultrasound of the abdomen and pelvis in an effort to diminish radiation.  If this is unrewarding and the pain continues we will get a CT.  I did tell her that if the pain persists or gets worse to go to the emergency room

## 2025-07-18 NOTE — REVIEW OF SYSTEMS
[Recent Wt Loss (___ Lbs)] : recent [unfilled] ~Ulb weight loss [Thyroid Disease] : thyroid disease [Diabetes] : diabetes  [Negative] : Psychiatric

## 2025-07-18 NOTE — HISTORY OF PRESENT ILLNESS
[To Bed: ___] : ~he/she~ goes to bed at [unfilled] [Arises: ___] : arises at [unfilled] [Sleep Onset Latency: ___ minutes] : sleep onset latency of [unfilled] minutes reported [Nocturnal Awakenings: ___] : ~he/she~ typically has [unfilled] nocturnal awakenings [Daytime Sleep: ___] : daytime sleep: [unfilled] [FreeTextEntry1] : Ms. Childress is 66-year-old female with moderate SHAHID on CPAP therapy who presents to the office for follow up.  PMHx: Anxiety/ Depression, HTN, Hypothyroidism, HLD  3/18/2016 split night study: Moderate SHAHID AHI 24.4/hr: the optimal pressure was 12 CMH20.   TX: Airsense 11 Autoset setup on 2024 by DME: [employer]  Patient reports she is compliant with CPAP therapy and reports benefit. She states she missed some days of CPAP use last month due to oral surgery.  She is current using nasal mask and reports mask leaks. Patient is tolerating pressure well.  Patient reports since starting Mounjaro 2 year ago she lost 60 lbs.   Medication list updated.   EPWORTH SLEEPINESS SCALE How likely are you to doze off or fall asleep in the situations described below, in contrast to feeling just tired? This refers to your usual way of life in recent times. Even if you haven't done some of these things recently, try to work out how they would have affected you. Use the following scale to choose one most appropriate number for each situation.......Chance of dozin= never. 1= slight. 2= moderate. 3= high. CHANCE OF DOZING............SITUATION 2.............................................Sitting and reading 1.............................................Watching TV 0.............................................Sitting inactive in a public place (eg a theatre or a meeting) 1.............................................As a passenger in a car for an hour without a break 1.............................................Lying down to rest in the afternoon when circumstances permit 0.............................................Sitting and talking to someone 0.............................................Sitting quietly after lunch without alcohol 0.............................................In a car, while stopped for a few minutes in traffic  5............................................TOTAL ESS SCORE

## 2025-07-18 NOTE — ASSESSMENT
[FreeTextEntry1] : Ms. Childress is 66-year-old female with moderate SHAHID on CPAP therapy who presents to the office for follow up.  PMHx: Anxiety/ Depression, HTN, Hypothyroidism, HLD  Discussed with patient CPAP compliance data: Compliant 43% of days, 43% for >4-hrs, average 5hrs 49mins. AHI 4.0/hr Leaks 41.9L/m. Patient is using and benefitting from CPAP therapy.  Recommended mask fitting for mask leaks. Patient to schedule an appointment for mask fitting at .   PAP resupply to continue therapy at the current settings. Annual follow-up, sooner if needed.

## 2025-07-23 NOTE — PROCEDURE
[FreeTextEntry1] : Maskfitting Patient is currently using a ResMed N20 nasal mask, size small. Patient was fit with a ResMed N30 nasal mask, size small.  SABA RAMIREZ was comfortable with the fit. Patient will try this mask and follow up with us within 2 weeks.  Patient uses [employer].

## 2025-07-23 NOTE — PROCEDURE
[FreeTextEntry1] : Maskfitting Patient is currently using a ResMed N20 nasal mask, size small. Patient was fit with a ResMed N30 nasal mask, size small.  SABA RAMIREZ was comfortable with the fit. Patient will try this mask and follow up with us within 2 weeks.  Patient uses [employer].

## 2025-07-29 NOTE — DATA REVIEWED
[FreeTextEntry1] : Labs and chart notes reviewed today with patient normal inflammatory markers negative subset serology   Normal ESR/CRP, Negative RF/CCP, X-rays no arthritic changes, no erosions

## 2025-07-29 NOTE — PHYSICAL EXAM
[General Appearance - Alert] : alert [General Appearance - In No Acute Distress] : in no acute distress [Impaired Insight] : insight and judgment were intact [Respiration, Rhythm And Depth] : normal respiratory rhythm and effort [Musculoskeletal - Swelling] : no joint swelling seen [FreeTextEntry1] : dry skin hands, erythema plamar aspect

## 2025-07-29 NOTE — ASSESSMENT
[FreeTextEntry1] : #Chronic bilateral hand pain, associated with prolonged stiffness pain and stiffness improved since starting HCQ June 2022 Normal ESR/CRP, Negative RF/CCP, X-rays no arthritic changes, no erosions  US of hands Mild hyperemia within the third MCP raising concern for acute synovitis. Bilateral enlarged hypoechoic median nerves raising compatible with median neuritis. -at this time, main symptom is the weakness of gripping/numbness suggestive of CTS recommend Hand therapy referral, to wear brace at night and reassess in 2-3 months, if no improvement to see hand ortho -will consider stepping up therapy to MTX or leflunomide if recurrence of pain/swelling    # UCTD Positive AZALEA; RNP 3.6, rest of serology negative small oral aperture, faint telangiectasias (face, hands), taut skin distally; no Raynaud's sister with scleroderma scleroderma labs negative - recommend to see derm and discuss role for biopsy,? seronegative scleroderma  -continue with  mg daily current weight 151 lb, can continue same dose of HCQ for now -Obtain monitoring blood work and  disease activity markers today -last eye exam by Dr Alcala October 2024 reviewed today- cleared to continue, due in 1 year    # Pulmonary /Cardiac  CT April 2025 Since 9/23/2024: Unchanged right upper lobe circumscribed 1.1 cm right upper lobe solid nodule. Unchanged right upper lobe 0.6 cm groundglass nodule. s/p biopsy RUL- negative for malignant cells, consistent hamartoma  -TTE/cardiac testing as per cardiology, last seen Feb 2025 note reviewed today, stable    RTO in 3 months

## 2025-07-29 NOTE — HISTORY OF PRESENT ILLNESS
[FreeTextEntry1] :  Last visit March 2025 At today's visit s.p lung biopsy negative for malignancy no hand pain but reports weakness gripping   continues to have itching of the hands, some redness  no SOB or cough

## 2025-07-29 NOTE — REVIEW OF SYSTEMS
[Fever] : no fever [Chills] : no chills [Eye Pain] : no eye pain [Red Eyes] : eyes not red [As Noted in HPI] : as noted in HPI